# Patient Record
(demographics unavailable — no encounter records)

---

## 2024-11-06 NOTE — HISTORY OF PRESENT ILLNESS
[de-identified] : 34-year-old patient living with HIV Previously uncontrolled on Genvoya but this was due to interrupted supply of ART as he lost meds at temporary shelter where he is living and so hadn't taken any ART for weeks/months. Prior to this he wasn't having any problems with Genvoya and reports his virus never had any resistance.  He was switched to Biktarvy and is herte to get recheck of VL. Last IZ5=678/33% and VL=44,000 June 2024 & 74,000 in August when started Biktarvy.  He is now housed and in a much more stable situation.  He is adherent to Biktarv and reports no side effects

## 2024-11-06 NOTE — HISTORY OF PRESENT ILLNESS
[de-identified] : 34-year-old patient living with HIV Previously uncontrolled on Genvoya but this was due to interrupted supply of ART as he lost meds at temporary shelter where he is living and so hadn't taken any ART for weeks/months. Prior to this he wasn't having any problems with Genvoya and reports his virus never had any resistance.  He was switched to Biktarvy and is herte to get recheck of VL. Last NN1=522/33% and VL=44,000 June 2024 & 74,000 in August when started Biktarvy.  He is now housed and in a much more stable situation.  He is adherent to Biktarv and reports no side effects

## 2024-11-06 NOTE — COUNSELING
[Yes] : Risk of tobacco use and health benefits of smoking cessation discussed: Yes [Cessation strategies including cessation program discussed] : Cessation strategies including cessation program discussed [Encouraged to pick a quit date and identify support needed to quit] : Encouraged to pick a quit date and identify support needed to quit [Hazards of at-risk alcohol use discussed] : Hazards of at-risk alcohol use discussed [Quit Drinking] : Quit Drinking [FreeTextEntry2] : we discussed harm reduction and decreasing binge drinking, triggers and how, now housed, it may be easier for him to decrease alcohol especially as he hopes to start working again [FreeTextEntry1] : 15

## 2024-11-06 NOTE — HEALTH RISK ASSESSMENT
[Current] : Current [10-14] : 10-14 [2 - 4 times a month (2 pts)] : 2-4 times a month (2 points) [7 to 9 (3 pts)] : 7 to 9 (3  points) [Weekly (3 pts)] : Weekly (3 points) [Yes] : In the past 12 months have you used drugs other than those required for medical reasons? Yes [0] : 2) Feeling down, depressed, or hopeless: Not at all (0) [PHQ-2 Negative - No further assessment needed] : PHQ-2 Negative - No further assessment needed [I have developed a follow-up plan documented below in the note.] : I have developed a follow-up plan documented below in the note. [Audit-CScore] : 8 [de-identified] : MJ only

## 2024-11-06 NOTE — ASSESSMENT
[FreeTextEntry1] : Assessment & Plan Patient is here today for routine follow up of HIV.  The last CD4 count was 450 and viral load was elevated when he was off ART.  He restarted 1-2 months ago.  Current ART is Biktarvy.  Reports adherence for last two weeks at more than 90%.  We discussed recommended frequency of labs and visits.  We plan on six-monthly visits.  We discussed recommended frequency of STD testing based on current exposure and sexual activity.  We discussed a healthy diet and incremental changes that they could make, exercise recommendations and what is feasible for them, healthy sleep patterns, sexual health, healthy relationships, work/life balance, and stress management.  We reviewed access to care including urgent care. Medication management - Patient is on Biktarvy with no reported side effects. He experienced a lapse in adherence to his medication regimen but has since resumed. - Continue current medication. Monitor for any side effects. Check HIV VL today  Syphilis Seems he never completed 3rd Bicillin - will give today and check rpr and titers.  Discussed recommendation to restart series.    Smoking and Alcohol use - Patient has a history of tobacco use since the age of 15. He consumes alcohol biweekly in binges.   - Advise patient to initiate a smoking cessation plan and consider transitioning to vaping. Also, extensive counseling to encourage reduction of alcohol consumption.  Marijuana use - Patient uses cannabis through both inhalation and ingestion. - Advise patient to transition to vaping and edible forms of cannabis.  Family history of diabetes - Patient has a maternal family history of diabetes mellitus, including his mother, grandmother, and great grandmother. - Monitor patient's blood glucose levels during today's laboratory tests.  Sexual activity - Patient reports sexual activity since the last consultation. - Conduct STD screenings today. We discussed DoxyPEP as an option to reduce risk of GC/CT/syphilis when taken correctly (200 mgs ASAP and within 72 hours of possible exposure).  We also discussed the need to continue to get screened at appropriate intervals as DoxyPEP is % effective.  Allergy to Morphine - Patient is allergic to Morphine. - Avoid prescribing Morphine.  Next visit and follow-up - Schedule a follow-up appointment in three months. - Discuss the results of today's laboratory tests and screenings during the next visit. - Consider referrals to smoking cessation programs and substance use counseling if needed.  ICD-10 codes (4) - Nicotine dependence, cigarettes, uncomplicated [F17.210] - Alcohol use, unspecified, uncomplicated [F10.90] - Cannabis use, unspecified, uncomplicated [F12.90] - Encounter for screening for infections with a predominantly sexual mode of transmission [Z11.3]

## 2024-11-06 NOTE — HEALTH RISK ASSESSMENT
[Current] : Current [10-14] : 10-14 [2 - 4 times a month (2 pts)] : 2-4 times a month (2 points) [7 to 9 (3 pts)] : 7 to 9 (3  points) [Weekly (3 pts)] : Weekly (3 points) [Yes] : In the past 12 months have you used drugs other than those required for medical reasons? Yes [0] : 2) Feeling down, depressed, or hopeless: Not at all (0) [PHQ-2 Negative - No further assessment needed] : PHQ-2 Negative - No further assessment needed [I have developed a follow-up plan documented below in the note.] : I have developed a follow-up plan documented below in the note. [Audit-CScore] : 8 [de-identified] : MJ only

## 2024-11-06 NOTE — HISTORY OF PRESENT ILLNESS
[de-identified] : 34-year-old patient living with HIV Previously uncontrolled on Genvoya but this was due to interrupted supply of ART as he lost meds at temporary shelter where he is living and so hadn't taken any ART for weeks/months. Prior to this he wasn't having any problems with Genvoya and reports his virus never had any resistance.  He was switched to Biktarvy and is herte to get recheck of VL. Last OB6=818/33% and VL=44,000 June 2024 & 74,000 in August when started Biktarvy.  He is now housed and in a much more stable situation.  He is adherent to Biktarv and reports no side effects

## 2024-11-06 NOTE — HEALTH RISK ASSESSMENT
[Current] : Current [10-14] : 10-14 [2 - 4 times a month (2 pts)] : 2-4 times a month (2 points) [7 to 9 (3 pts)] : 7 to 9 (3  points) [Weekly (3 pts)] : Weekly (3 points) [Yes] : In the past 12 months have you used drugs other than those required for medical reasons? Yes [0] : 2) Feeling down, depressed, or hopeless: Not at all (0) [PHQ-2 Negative - No further assessment needed] : PHQ-2 Negative - No further assessment needed [I have developed a follow-up plan documented below in the note.] : I have developed a follow-up plan documented below in the note. [Audit-CScore] : 8 [de-identified] : MJ only

## 2024-11-06 NOTE — HISTORY OF PRESENT ILLNESS
[de-identified] : 34-year-old patient living with HIV Previously uncontrolled on Genvoya but this was due to interrupted supply of ART as he lost meds at temporary shelter where he is living and so hadn't taken any ART for weeks/months. Prior to this he wasn't having any problems with Genvoya and reports his virus never had any resistance.  He was switched to Biktarvy and is herte to get recheck of VL. Last DF8=698/33% and VL=44,000 June 2024 & 74,000 in August when started Biktarvy.  He is now housed and in a much more stable situation.  He is adherent to Biktarv and reports no side effects

## 2024-11-06 NOTE — REVIEW OF SYSTEMS
[FreeTextEntry2] : Eyes:  no discharge and no vision problems.   HEENT:  no earache, no nasal discharge and no sore throat.   Cardiovascular:  no chest pain, no palpitations, no leg claudication, no lower extremity edema and no orthopnea.   Respiratory:  no shortness of breath and no cough.   Gastrointestinal:  no abdominal pain, no constipation, diarrhea, no vomiting, no heartburn and no melena.   Genitourinary:  no dysuria, no hematuria and no frequency.   Integumentary:  no skin rash.   Neurological: no seizures  no headache.   Constitutional review of systems are otherwise negative except as noted in HPI.

## 2024-11-06 NOTE — HEALTH RISK ASSESSMENT
[Current] : Current [10-14] : 10-14 [2 - 4 times a month (2 pts)] : 2-4 times a month (2 points) [7 to 9 (3 pts)] : 7 to 9 (3  points) [Weekly (3 pts)] : Weekly (3 points) [Yes] : In the past 12 months have you used drugs other than those required for medical reasons? Yes [0] : 2) Feeling down, depressed, or hopeless: Not at all (0) [PHQ-2 Negative - No further assessment needed] : PHQ-2 Negative - No further assessment needed [I have developed a follow-up plan documented below in the note.] : I have developed a follow-up plan documented below in the note. [Audit-CScore] : 8 [de-identified] : MJ only

## 2025-01-16 NOTE — PHYSICAL EXAM
[No Acute Distress] : no acute distress [Normal Sclera/Conjunctiva] : normal sclera/conjunctiva [Normal Outer Ear/Nose] : the outer ears and nose were normal in appearance [No Lymphadenopathy] : no lymphadenopathy [Supple] : supple [No Respiratory Distress] : no respiratory distress  [No Accessory Muscle Use] : no accessory muscle use [Clear to Auscultation] : lungs were clear to auscultation bilaterally [Normal Rate] : normal rate  [Regular Rhythm] : with a regular rhythm [Normal S1, S2] : normal S1 and S2 [Pedal Pulses Present] : the pedal pulses are present [No Edema] : there was no peripheral edema [Soft] : abdomen soft [Non Tender] : non-tender [Non-distended] : non-distended [Normal Bowel Sounds] : normal bowel sounds [No Focal Deficits] : no focal deficits [Normal Gait] : normal gait [Alert and Oriented x3] : oriented to person, place, and time

## 2025-01-17 NOTE — REVIEW OF SYSTEMS
[Fever] : no fever [Chills] : no chills [Recent Change In Weight] : ~T no recent weight change [Vision Problems] : no vision problems [Nasal Discharge] : no nasal discharge [Chest Pain] : no chest pain [Palpitations] : no palpitations [Lower Ext Edema] : no lower extremity edema [Shortness Of Breath] : no shortness of breath [Wheezing] : no wheezing [Dyspnea on Exertion] : not dyspnea on exertion [Abdominal Pain] : no abdominal pain [Nausea] : no nausea [Constipation] : no constipation [Diarrhea] : no diarrhea [Heartburn] : no heartburn [Dysuria] : no dysuria [Incontinence] : no incontinence [Frequency] : no frequency

## 2025-01-17 NOTE — HEALTH RISK ASSESSMENT
[0] : 2) Feeling down, depressed, or hopeless: Not at all (0) [PHQ-2 Negative - No further assessment needed] : PHQ-2 Negative - No further assessment needed [[id number]] : 0

## 2025-01-17 NOTE — END OF VISIT
[] : Resident [FreeTextEntry3] : I met with patient and confirmed history and exam as detailed by resident and concur with assessment and plan of care.  Suspect he is using alcohol, MJ to self-medicate - referral for Psychiatry.  Has dog as Service Animal to ease anxiety in public situations.  He understands needs for Bic for 3 weeks.  Cefi tofat.  we discussed recent std screening results.  Also of note, tthe last visit an HCV VL was requested and a Qualitative result is posted.  HCV VL specifically ordered. Patient will contact any known contacts to advise them of need for screening and/or treatment Reviewed that infectious for 7 days after treatment All questions answered

## 2025-01-17 NOTE — HEALTH RISK ASSESSMENT
[0] : 2) Feeling down, depressed, or hopeless: Not at all (0) [PHQ-2 Negative - No further assessment needed] : PHQ-2 Negative - No further assessment needed [[id number]] : 0

## 2025-01-17 NOTE — HISTORY OF PRESENT ILLNESS
[FreeTextEntry1] : HIV Follow up PHQ-2(0PT) [de-identified] : Mr. Bartlett is a 34 year old male with a past medical history of HIV (On Biktarvy, VL <30 11/24, CD4 464 06/24), concern for Hepatitis C pendign confirmatory testing, latent syphilis, and ghonorrhea who presents for routine follow up in the clinic.   On discussion with the patient, he reports about 85% adherence to Biktarvy, though reports about 3 days the previous week in which he was unable to  prescription from pharmacy. He reports that he was unable to  Doxypep after the previous visit and so has not used any of the medication. He reports that he is sexually active with 2-3 partners over the last month, reports both male and female sexual partners with both oral and receptive anal sex. Reports that he recently reunited with his ex, so has been monogamous more recently.   He continues to vape nicotine primarily, reports fewer than 10 cigarettes over the past 2 weeks, reports minimal alcohol use 3-4 drinks with a frequency monthly, and recreational marijuana use.   He recently received an emotional service animal that has been providing him support but would like to speak to a therapist/psychiatrist for assistance with his anxiety.   Patient denies symptoms of rectal discomfort or pain in association with previous gonorrhea, does not appear that he received treatment as planned in November.

## 2025-01-17 NOTE — HISTORY OF PRESENT ILLNESS
[FreeTextEntry1] : HIV Follow up PHQ-2(0PT) [de-identified] : Mr. Bartlett is a 34 year old male with a past medical history of HIV (On Biktarvy, VL <30 11/24, CD4 464 06/24), concern for Hepatitis C pendign confirmatory testing, latent syphilis, and ghonorrhea who presents for routine follow up in the clinic.   On discussion with the patient, he reports about 85% adherence to Biktarvy, though reports about 3 days the previous week in which he was unable to  prescription from pharmacy. He reports that he was unable to  Doxypep after the previous visit and so has not used any of the medication. He reports that he is sexually active with 2-3 partners over the last month, reports both male and female sexual partners with both oral and receptive anal sex. Reports that he recently reunited with his ex, so has been monogamous more recently.   He continues to vape nicotine primarily, reports fewer than 10 cigarettes over the past 2 weeks, reports minimal alcohol use 3-4 drinks with a frequency monthly, and recreational marijuana use.   He recently received an emotional service animal that has been providing him support but would like to speak to a therapist/psychiatrist for assistance with his anxiety.   Patient denies symptoms of rectal discomfort or pain in association with previous gonorrhea, does not appear that he received treatment as planned in November.

## 2025-05-27 NOTE — END OF VISIT
[] : Resident [FreeTextEntry3] : # HCV- dx 11/2024 (prior neg screen 6/2024), not yet on tx but eager to start today. Low concern for cirrhosis based on age, no s/s cirrhosis on hx/exam, short time since HCV acquisition. Fib4 0.67 using 1/2025 labs. HepB neg/immune 8/2024. Confirmed treatment-naive, no s/s HCC or hx liver transplant. Meds reconciled. Disc recommendation for treatment, side effects, adherence support reviewed in detail by me and pharmD. Will rx Epclusa x12 weeks to avoid interaction w ART. Recheck CBC, CMP, HCV RNA, HAV IgG, and will send HCV genotype in case of tx failure but above regimen pangenotypic. F/u 4 weeks for adherence support and for below items.  # Hx Syphilis- [facility] registry reviewed, confirmed early syphilis treated 5/2023 (titer at time 1:2048 one week before above tx), next titer available 1:64 13 months later, downtrending since then, most recently serofast 1:8. Since above tx has received bicillin x1 on 4 occasions (never 3 weeks straight) as [facility] report unavailable and concern at time for late latent syphilis. Today d/w pt prior syphilis appropriately treated and no need for further tx at this time. Will perform routine RPR screening today to cont post-tx monitoring. Since last visit interval activity w former partner, amenable for routine testing today. Has doxypep, not currently sexually active, reviewed usage/dosing.  # HIV- missing 1-2 doses of biktarvy/week, using phone alarm but sometimes runs late for work. Given keychain pill pan to assist. Advised to separate ART from multivitamin by 6+ hrs. Today check VL, T cells, CMP # Hx Smoking- quit smoking and vaping since last visit, congratulated and encouraged to cont progress. Still w persistent cravings, amenable to NRT. Recommended lozenges but pt prefers patches, disc possibility of too high nicotine content but pt wishes to try, rx sent. Pt also sober from alcohol/illicit drugs.  # HCM- Shingrix and tdap today, declines covid vaccine. Confirms HPV UTD. Hx mpox disease so does not need this vaccine. MMR vaccine d/w pt by resident MD after I left room and given prior to my review, likely ok based on prior CD4 and reported adherence, will f/u T cell subset drawn today Satisfactory

## 2025-05-27 NOTE — REVIEW OF SYSTEMS
[Constipation] : constipation [Negative] : Gastrointestinal [Anxiety] : anxiety [Abdominal Pain] : no abdominal pain [Nausea] : no nausea [Vomiting] : no vomiting [Dysuria] : no dysuria [Hematuria] : no hematuria

## 2025-05-27 NOTE — HISTORY OF PRESENT ILLNESS
[FreeTextEntry1] : HIV f/u [de-identified] : Mr. Bartlett is a 35 year old male with a past medical history of HIV (On Biktarvy, VL <30 1/25, CD4 798 1/25), HCV infection, latent syphilis, and gonorrhea who presents for follow up appointment. Patient reports missing a couple of doses of Biktarvy per week, due to leaving the house in a hurry for work in the morning. Patient is interested in starting HCV treatment as soon as possible. He denied having any abdominal pain, nausea or vomiting. He reported being sexually active with his ex and using condoms. Patient reports complete cessation of alcohol consumptions and smoking cigarettes as well as vaping. He endorsed having nicotine cravings intermittently. Patient denied using DoxyPEP as it was sent to wrong pharmacy.